# Patient Record
Sex: FEMALE | URBAN - METROPOLITAN AREA
[De-identification: names, ages, dates, MRNs, and addresses within clinical notes are randomized per-mention and may not be internally consistent; named-entity substitution may affect disease eponyms.]

---

## 2023-07-12 ENCOUNTER — TELEPHONE (OUTPATIENT)
Age: 48
End: 2023-07-12

## 2023-07-17 NOTE — TELEPHONE ENCOUNTER
#[id number]    No answer phone just rings, unable to leave message at phone ending [phone], follow up to schedule for mammogram on 11/7/23 at [facility]

## 2023-07-17 NOTE — TELEPHONE ENCOUNTER
Saw where an attempt to contact pt was made. Sending letter with phone number for pt to call to make an appt.  Houston Hutchison RN